# Patient Record
Sex: FEMALE | URBAN - METROPOLITAN AREA
[De-identification: names, ages, dates, MRNs, and addresses within clinical notes are randomized per-mention and may not be internally consistent; named-entity substitution may affect disease eponyms.]

---

## 2024-07-25 ENCOUNTER — OFFICE VISIT (OUTPATIENT)
Dept: ORTHOPEDIC SURGERY | Age: 64
End: 2024-07-25
Payer: COMMERCIAL

## 2024-07-25 VITALS — WEIGHT: 124.2 LBS | BODY MASS INDEX: 22.01 KG/M2 | HEIGHT: 63 IN

## 2024-07-25 DIAGNOSIS — M17.11 PRIMARY OSTEOARTHRITIS OF RIGHT KNEE: ICD-10-CM

## 2024-07-25 DIAGNOSIS — M25.561 RIGHT KNEE PAIN, UNSPECIFIED CHRONICITY: Primary | ICD-10-CM

## 2024-07-25 PROCEDURE — 20610 DRAIN/INJ JOINT/BURSA W/O US: CPT | Performed by: ORTHOPAEDIC SURGERY

## 2024-07-25 PROCEDURE — 99204 OFFICE O/P NEW MOD 45 MIN: CPT | Performed by: ORTHOPAEDIC SURGERY

## 2024-07-25 RX ORDER — METHYLPREDNISOLONE ACETATE 40 MG/ML
40 INJECTION, SUSPENSION INTRA-ARTICULAR; INTRALESIONAL; INTRAMUSCULAR; SOFT TISSUE ONCE
Status: COMPLETED | OUTPATIENT
Start: 2024-07-25 | End: 2024-07-25

## 2024-07-25 RX ADMIN — METHYLPREDNISOLONE ACETATE 40 MG: 40 INJECTION, SUSPENSION INTRA-ARTICULAR; INTRALESIONAL; INTRAMUSCULAR; SOFT TISSUE at 13:58

## 2024-07-25 NOTE — PROGRESS NOTES
without obvious mass   Chest: Symmetric  Lungs:  Breathing non-labored. No tachypnea noted.  Abdomen: Soft on gross examination without obvious distention.  Neuro: No obvious neurologic deficit. Grossly moves bilateral upper extremities without motor or sensory deficits. No gross weakness noted in the lower extremities. No hyporeflexia or hyperreflexia noted.  Vascular: No gross arterial or venous deficiency noted. DP and PT pulses are palpable in the lower extremities  Lymphatic: No lymphedema noted in the lower extremities.  Skin: No prior incisions noted about the right knee. No obvious rashes noted about the area. No skin changes noted about the knee or about the adjacent thigh or leg.  Extremities:  Patient ambulates with an antalgic gait. There is pain with ROM of the right knee. Range of motion is 0-120. Trace effusion noted in the knee. Patellofemoral crepitus is present. Distally the patient shows no neurologic deficit.        Xrays (obtained either today or previously):    XR Knee 3/4 View  Views: AP knee, skiers PA, lateral knee, sunrise view right knee  Clinical indication: Right Knee Pain   Findings: Evidence of mild osteoarthritic changes. The joint line appears to be fairly well maintained although there appears to be some narrowing of the space. No significant osteophyte formation noted. No advanced subchondral cyst formation noted or sclerosis appreciated.  Impression:Knee Osteoarthritis    Virgil Villatoro MD      Assessment:   1. Arthritis of the Right knee    Plan:  Differential diagnosis and treatment options have been discussed with the patient. Risks, benefits and alternatives of each were discussed and patient questions answered. The patient understands the nature of knee arthritis and that this is generally a progressive condition. We discussed oral anti-inflammatory medications, activity modifications, physical therapy, corticosteroid injections, weight loss (if appropriate), and surgery. We

## 2024-10-29 RX ORDER — ONDANSETRON 4 MG/1
4 TABLET, FILM COATED ORAL 3 TIMES DAILY PRN
COMMUNITY
Start: 2022-11-28 | End: 2026-07-11

## 2024-10-29 RX ORDER — CITALOPRAM HYDROBROMIDE 20 MG/1
20 TABLET ORAL DAILY
COMMUNITY
Start: 2022-12-01 | End: 2024-11-30

## 2024-10-30 ENCOUNTER — OFFICE VISIT (OUTPATIENT)
Dept: ORTHOPEDIC SURGERY | Age: 64
End: 2024-10-30

## 2024-10-30 DIAGNOSIS — G57.61 MORTON'S NEUROMA OF RIGHT FOOT: ICD-10-CM

## 2024-10-30 DIAGNOSIS — M79.671 RIGHT FOOT PAIN: Primary | ICD-10-CM

## 2024-10-30 NOTE — PROGRESS NOTES
Name: Madiha Young  YOB: 1960  Gender: female  MRN: [id number]    Summary:     Right third webspace Castro's neuroma     CC: New Patient (Right foot xrays obtained in office today )       HPI: Madiha Young is a 64 y.o. female who presents with New Patient (Right foot xrays obtained in office today )  .  This patient presents the office today with a several month history of worsening right forefoot pain.  She has pain with shoes but better when barefoot.    History was obtained by Patient     ROS/Meds/PSH/PMH/FH/SH: I personally reviewed the patients standard intake form.  Below are the pertinents    Tobacco:  has no history on file for tobacco use.  Diabetes: None      Physical Examination:  Exam of the right lower extremity shows tenderness palpation of the third webspace with a palpable Tania's click.  There is no MTP joint pain.  She has palpable pulses and intact sensation.      Imaging:   Interpretation of imaging  Right foot XR: AP, Lateral, Oblique views     ICD-10-CM    1. Right foot pain  M79.671 XR FOOT RIGHT (MIN 3 VIEWS)      2. Castro's neuroma of right foot  G57.61          Report: AP, lateral, oblique x-ray of the right foot demonstrates no fracture or AVN    Impression: No fracture or AVN   QUINN WAKEFIELD III, MD           Assessment:   Right third webspace Castro's neuroma    Treatment Plan:   4 This is a chronic illness/condition with exacerbation and progression  Treatment at this time: Minor Procedure: Injection done today: The patient understands the risks and complications associated with injection. After sterile prep of the area, the Right neuroma was injected with 3 cc. of Xylocaine and 3 cc. of steroid.. 40mg Depo Medrol was the steroid used.  Patient tolerated it well.  I discussed the risk of infection and skin blanching.  I told the be patient be careful about the symptoms of hyperglycemia such as GI distress, polyuria, excessive thirst and lethargy.  If these symptoms occur

## 2024-10-31 ENCOUNTER — OFFICE VISIT (OUTPATIENT)
Dept: ORTHOPEDIC SURGERY | Age: 64
End: 2024-10-31

## 2024-10-31 DIAGNOSIS — M17.11 PRIMARY OSTEOARTHRITIS OF RIGHT KNEE: Primary | ICD-10-CM

## 2024-10-31 RX ORDER — METHYLPREDNISOLONE ACETATE 40 MG/ML
40 INJECTION, SUSPENSION INTRA-ARTICULAR; INTRALESIONAL; INTRAMUSCULAR; SOFT TISSUE ONCE
Status: COMPLETED | OUTPATIENT
Start: 2024-10-31 | End: 2024-10-31

## 2024-10-31 RX ADMIN — METHYLPREDNISOLONE ACETATE 40 MG: 40 INJECTION, SUSPENSION INTRA-ARTICULAR; INTRALESIONAL; INTRAMUSCULAR; SOFT TISSUE at 14:08

## 2024-10-31 NOTE — PROGRESS NOTES
Patient ID:  Madiha Young  [id number]  64 y.o.  1960    Today: October 31, 2024          Chief Complaint:  Right Knee pain    HPI:       Madiha Young is a 64 y.o. female seen for evaluation and treatment of followup of right knee osteoarthritis. The patient reports pain along the joint lines, reports stiffness of the knee with prolonged inactivity, and swelling/pain at the end of the day and after increased physical activity. Generally, symptoms improve with sitting/rest. The pain affects the patient’s activities of daily living and quality of life. Patient reports progressive pain and instability in the knee. The pain has been ongoing for an extended period of time. Pain ranges from approximately 4-8 in a cyclical fashion with periods of acute exacerbation.     Patient has attempted prior conservative treatment including medications and activity modification.    Treatment to date has included OTC medications and activity modification. At one point the patient has had a steroid injection in the right knee which provided 3+ months of pain improvement.    Past Medical History:  No past medical history on file.    Past Surgical History:  No past surgical history on file.     Medications:     Prior to Admission medications    Medication Sig Start Date End Date Taking? Authorizing Provider   ondansetron (ZOFRAN) 4 MG tablet Take 1 tablet by mouth 3 times daily as needed 11/28/22 7/11/26  Dinora Perez MD   citalopram (CELEXA) 20 MG tablet Take 1 tablet by mouth daily 12/1/22 11/30/24  ProviderDinora MD       Family History:   No family history on file.    Social History:      Social History     Tobacco Use    Smoking status: Not on file    Smokeless tobacco: Not on file   Substance Use Topics    Alcohol use: Not on file         Allergies:    Not on File     Vitals:   There were no vitals taken for this visit.    ROS:   Review of Systems         Objective:   General: Patient is awake and in no acute

## 2024-10-31 NOTE — PATIENT INSTRUCTIONS
Learning About Joint Injections  What are joint injections?     Joint injections are shots into a joint, such as the knee or shoulder. They are used to put in medicines, such as pain relievers and steroid medicines. Steroids can help reduce inflammation. A steroid shot can sometimes help with short-term pain relief when other treatments haven't worked.  How are they done?  First, the area over the joint will be cleaned. Your doctor may then use a tiny needle to numb the skin in the area where you will get the joint injection.  If a tiny needle is used to numb the area, your doctor will use another needle to inject the medicine. Your doctor may use a pain reliever, a steroid, or both. You may feel some pressure or discomfort.  Your doctor may put ice on the area before you go home.  What can you expect after a joint injection?  You will probably go home soon after your shot. You may have numbness around the joint for a few hours.  If your shot included both a pain reliever and a steroid, then the pain will probably go away right away. But it might come back after a few hours. This might happen if the pain reliever wears off and the steroid hasn't started to work yet. Steroids don't always work. But when they do, the pain relief can last for several days to a few months or longer.  Your doctor may tell you to use ice on the area. You can also use ice if the pain comes back. Put ice or a cold pack on your joint for 10 to 20 minutes at a time. Put a thin cloth between the ice and your skin.  Follow your doctor's instructions carefully.  Follow-up care is a key part of your treatment and safety. Be sure to make and go to all appointments, and call your doctor if you are having problems. It's also a good idea to know your test results and keep a list of the medicines you take.  Current as of: July 17, 2023  Content Version: 14.2  © 2024 [employer].   Care instructions adapted under license by [facility]

## 2024-11-08 ENCOUNTER — OFFICE VISIT (OUTPATIENT)
Dept: ORTHOPEDIC SURGERY | Age: 64
End: 2024-11-08

## 2024-11-08 DIAGNOSIS — M79.671 RIGHT FOOT PAIN: Primary | ICD-10-CM

## 2024-11-08 PROCEDURE — M5020 MISC METATARSAL COOKIES: HCPCS | Performed by: ORTHOPAEDIC SURGERY

## 2024-11-08 NOTE — PROGRESS NOTES
Patient was fitted and instructed on three Metatarsal Cookies for the right foot. Patient read and signed documenting they understand and agree to [facility]'s current DME return policy.

## 2025-04-04 ENCOUNTER — OFFICE VISIT (OUTPATIENT)
Dept: ORTHOPEDIC SURGERY | Age: 65
End: 2025-04-04

## 2025-04-04 DIAGNOSIS — M17.11 PRIMARY OSTEOARTHRITIS OF RIGHT KNEE: Primary | ICD-10-CM

## 2025-04-04 RX ORDER — CYCLOBENZAPRINE HCL 5 MG
5 TABLET ORAL 3 TIMES DAILY PRN
Qty: 30 TABLET | Refills: 0 | Status: SHIPPED | OUTPATIENT
Start: 2025-04-04 | End: 2025-04-14

## 2025-04-04 RX ORDER — METHYLPREDNISOLONE ACETATE 40 MG/ML
40 INJECTION, SUSPENSION INTRA-ARTICULAR; INTRALESIONAL; INTRAMUSCULAR; SOFT TISSUE ONCE
Status: COMPLETED | OUTPATIENT
Start: 2025-04-04 | End: 2025-04-04

## 2025-04-04 RX ORDER — CELECOXIB 100 MG/1
100 CAPSULE ORAL 2 TIMES DAILY
Qty: 60 CAPSULE | Refills: 0 | Status: SHIPPED | OUTPATIENT
Start: 2025-04-04

## 2025-04-04 RX ADMIN — METHYLPREDNISOLONE ACETATE 40 MG: 40 INJECTION, SUSPENSION INTRA-ARTICULAR; INTRALESIONAL; INTRAMUSCULAR; SOFT TISSUE at 10:39

## 2025-04-04 NOTE — PATIENT INSTRUCTIONS
Learning About Joint Injections  What are joint injections?     Joint injections are shots into a joint, such as the knee or shoulder. They are used to put in medicines, such as pain relievers and steroid medicines. Steroids can help reduce inflammation. A steroid shot can sometimes help with short-term pain relief when other treatments haven't worked.  How are they done?  First, the area over the joint will be cleaned. Your doctor may then use a tiny needle to numb the skin in the area where you will get the joint injection.  If a tiny needle is used to numb the area, your doctor will use another needle to inject the medicine. Your doctor may use a pain reliever, a steroid, or both. You may feel some pressure or discomfort.  Your doctor may put ice on the area before you go home.  What can you expect after a joint injection?  You will probably go home soon after your shot. You may have numbness around the joint for a few hours.  If your shot included both a pain reliever and a steroid, then the pain will probably go away right away. But it might come back after a few hours. This might happen if the pain reliever wears off and the steroid hasn't started to work yet. Steroids don't always work. But when they do, the pain relief can last for several days to a few months or longer.  Your doctor may tell you to use ice on the area. You can also use ice if the pain comes back. Put ice or a cold pack on your joint for 10 to 20 minutes at a time. Put a thin cloth between the ice and your skin.  Follow your doctor's instructions carefully.  Follow-up care is a key part of your treatment and safety. Be sure to make and go to all appointments, and call your doctor if you are having problems. It's also a good idea to know your test results and keep a list of the medicines you take.  Current as of: July 31, 2024  Content Version: 14.4  © 2024-2025 [employer].   Care instructions adapted under license by [facility]

## 2025-04-04 NOTE — PROGRESS NOTES
Patient ID:  Madiha Young  [id number]  64 y.o.  1960    Today: April 4, 2025          Chief Complaint:  Right Knee pain    HPI:       Madiha Young is a 64 y.o. female seen for evaluation and treatment of followup of right knee osteoarthritis. The patient reports pain along the joint lines, reports stiffness of the knee with prolonged inactivity, and swelling/pain at the end of the day and after increased physical activity. Generally, symptoms improve with sitting/rest. The pain affects the patient’s activities of daily living and quality of life.   She is also having pain in her buttock that radiates down the right leg past the knee.    Patient has attempted prior conservative treatment including medications and activity modification.    Treatment to date has included OTC medications and activity modification. At one point the patient has had a steroid injection in the right knee which provided 3+ months of pain improvement.    Past Medical History:  No past medical history on file.    Past Surgical History:  No past surgical history on file.     Medications:     Prior to Admission medications    Medication Sig Start Date End Date Taking? Authorizing Provider   cyclobenzaprine (FLEXERIL) 5 MG tablet Take 1 tablet by mouth 3 times daily as needed for Muscle spasms 4/4/25 4/14/25 Yes Guillermo Wells APRN - CNP   celecoxib (CELEBREX) 100 MG capsule Take 1 capsule by mouth 2 times daily 4/4/25  Yes Guillermo Wells APRN - CNP   ondansetron (ZOFRAN) 4 MG tablet Take 1 tablet by mouth 3 times daily as needed 11/28/22 7/11/26  Dinora Perez MD   citalopram (CELEXA) 20 MG tablet Take 1 tablet by mouth daily 12/1/22 11/30/24  Dinora Perez MD       Family History:   No family history on file.    Social History:      Social History     Tobacco Use    Smoking status: Not on file    Smokeless tobacco: Not on file   Substance Use Topics    Alcohol use: Not on file         Allergies:    Not on

## 2025-04-10 DIAGNOSIS — M17.11 PRIMARY OSTEOARTHRITIS OF RIGHT KNEE: Primary | ICD-10-CM

## 2025-04-10 RX ORDER — DICLOFENAC SODIUM 75 MG/1
75 TABLET, DELAYED RELEASE ORAL 2 TIMES DAILY PRN
Qty: 60 TABLET | Refills: 0 | Status: SHIPPED | OUTPATIENT
Start: 2025-04-10

## 2025-04-10 RX ORDER — MELOXICAM 15 MG/1
15 TABLET ORAL DAILY
Qty: 30 TABLET | Refills: 0 | Status: SHIPPED | OUTPATIENT
Start: 2025-04-10 | End: 2025-04-10

## 2025-08-12 RX ORDER — METHYLPREDNISOLONE 4 MG/1
TABLET ORAL
COMMUNITY
Start: 2025-01-23

## 2025-08-12 RX ORDER — ALBUTEROL SULFATE 90 UG/1
INHALANT RESPIRATORY (INHALATION)
COMMUNITY
Start: 2025-01-23

## 2025-08-12 RX ORDER — POLYETHYLENE GLYCOL 3350 17 G/17G
17 POWDER, FOR SOLUTION ORAL DAILY
COMMUNITY
Start: 2024-08-07

## 2025-08-12 RX ORDER — FOLIC ACID 1 MG/1
1 TABLET ORAL DAILY
COMMUNITY
Start: 2025-03-10

## 2025-08-12 RX ORDER — CYCLOBENZAPRINE HCL 10 MG
10 TABLET ORAL 3 TIMES DAILY PRN
COMMUNITY
Start: 2024-08-07

## 2025-08-12 RX ORDER — DOXYCYCLINE 100 MG/1
CAPSULE ORAL
COMMUNITY
Start: 2025-01-23

## 2025-08-12 RX ORDER — LANOLIN ALCOHOL/MO/W.PET/CERES
1000 CREAM (GRAM) TOPICAL DAILY
COMMUNITY
Start: 2024-12-09 | End: 2025-12-09

## 2025-08-12 RX ORDER — METHYLPREDNISOLONE ACETATE 40 MG/ML
40 INJECTION, SUSPENSION INTRA-ARTICULAR; INTRALESIONAL; INTRAMUSCULAR; SOFT TISSUE ONCE
Status: CANCELLED | OUTPATIENT
Start: 2025-08-12 | End: 2025-08-12

## 2025-08-12 RX ORDER — PSEUDOEPHEDRINE HCL 30 MG
TABLET ORAL
COMMUNITY
Start: 2024-08-07

## 2025-08-13 ENCOUNTER — OFFICE VISIT (OUTPATIENT)
Dept: ORTHOPEDIC SURGERY | Age: 65
End: 2025-08-13

## 2025-08-13 DIAGNOSIS — G57.61 MORTON'S NEUROMA OF RIGHT FOOT: Primary | ICD-10-CM

## 2025-08-13 RX ORDER — BENZOCAINE/MENTHOL 6 MG-10 MG
LOZENGE MUCOUS MEMBRANE 2 TIMES DAILY PRN
COMMUNITY
Start: 2025-05-05

## 2025-08-13 RX ORDER — COLCHICINE 0.6 MG/1
TABLET ORAL
COMMUNITY
Start: 2025-05-05

## 2025-08-13 RX ORDER — ACYCLOVIR 400 MG/1
TABLET ORAL
COMMUNITY

## 2025-08-13 RX ORDER — KETOCONAZOLE 20 MG/G
CREAM TOPICAL
COMMUNITY
Start: 2025-07-22

## 2025-08-13 RX ORDER — METHYLPREDNISOLONE ACETATE 40 MG/ML
40 INJECTION, SUSPENSION INTRA-ARTICULAR; INTRALESIONAL; INTRAMUSCULAR; SOFT TISSUE ONCE
Status: COMPLETED | OUTPATIENT
Start: 2025-08-13 | End: 2025-08-13

## 2025-08-13 RX ORDER — VALACYCLOVIR HYDROCHLORIDE 1 G/1
TABLET, FILM COATED ORAL
COMMUNITY
Start: 2025-07-22

## 2025-08-13 RX ORDER — PIMECROLIMUS 10 MG/G
CREAM TOPICAL
COMMUNITY
Start: 2025-08-01

## 2025-08-13 RX ORDER — ALLOPURINOL 100 MG/1
100 TABLET ORAL DAILY
COMMUNITY
Start: 2025-05-05

## 2025-08-13 RX ADMIN — METHYLPREDNISOLONE ACETATE 40 MG: 40 INJECTION, SUSPENSION INTRA-ARTICULAR; INTRALESIONAL; INTRAMUSCULAR; SOFT TISSUE at 11:14
